# Patient Record
Sex: FEMALE | URBAN - METROPOLITAN AREA
[De-identification: names, ages, dates, MRNs, and addresses within clinical notes are randomized per-mention and may not be internally consistent; named-entity substitution may affect disease eponyms.]

---

## 2023-10-27 ENCOUNTER — APPOINTMENT (RX ONLY)
Dept: URBAN - METROPOLITAN AREA CLINIC 154 | Facility: CLINIC | Age: 43
Setting detail: DERMATOLOGY
End: 2023-10-27

## 2023-10-27 DIAGNOSIS — L73.2 HIDRADENITIS SUPPURATIVA: ICD-10-CM

## 2023-10-27 PROCEDURE — ? PRESCRIPTION

## 2023-10-27 PROCEDURE — ? ORDER TESTS

## 2023-10-27 PROCEDURE — ? TREATMENT REGIMEN

## 2023-10-27 PROCEDURE — 99204 OFFICE O/P NEW MOD 45 MIN: CPT

## 2023-10-27 PROCEDURE — ? COUNSELING

## 2023-10-27 RX ORDER — CLINDAMYCIN PHOSPHATE 10 MG/G
GEL TOPICAL
Qty: 60 | Refills: 3 | Status: ERX | COMMUNITY
Start: 2023-10-27

## 2023-10-27 RX ORDER — SPIRONOLACTONE 100 MG/1
TABLET, FILM COATED ORAL
Qty: 90 | Refills: 0 | Status: ERX | COMMUNITY
Start: 2023-10-27

## 2023-10-27 RX ORDER — DOXYCYCLINE 100 MG/1
CAPSULE ORAL
Qty: 28 | Refills: 0 | Status: ERX | COMMUNITY
Start: 2023-10-27

## 2023-10-27 RX ADMIN — CLINDAMYCIN PHOSPHATE: 10 GEL TOPICAL at 00:00

## 2023-10-27 RX ADMIN — SPIRONOLACTONE: 100 TABLET, FILM COATED ORAL at 00:00

## 2023-10-27 RX ADMIN — DOXYCYCLINE: 100 CAPSULE ORAL at 00:00

## 2023-10-27 ASSESSMENT — LOCATION SIMPLE DESCRIPTION DERM
LOCATION SIMPLE: RIGHT THIGH
LOCATION SIMPLE: RIGHT BUTTOCK

## 2023-10-27 ASSESSMENT — LOCATION DETAILED DESCRIPTION DERM
LOCATION DETAILED: RIGHT ANTERIOR PROXIMAL THIGH
LOCATION DETAILED: RIGHT BUTTOCK

## 2023-10-27 ASSESSMENT — LOCATION ZONE DERM
LOCATION ZONE: LEG
LOCATION ZONE: TRUNK

## 2023-10-27 NOTE — PROCEDURE: ORDER TESTS
Expected Date Of Service: 10/27/2023
Lab Facility: [id number]
Performing Laboratory: [id number]
Bill For Surgical Tray: no
Billing Type: Third-Party Bill

## 2023-10-27 NOTE — PROCEDURE: TREATMENT REGIMEN
Detail Level: Zone
Initiate Treatment: Doxycycline 100 mg BID for 2 weeks. \\nClindamycin 1% gel to use bid during active flares and qd as maintenance. \\nRecommended OTC Panoxyl wash to use daily.\\nSpironolactone 100 mg QAM. Counseled side effects. \\nOrdered labs for BMP to be drawn after 1 week of taking since pt is on lisinopril (10mg)\\nPlan for follow up within 2-3 months

## 2024-01-05 ENCOUNTER — APPOINTMENT (RX ONLY)
Dept: URBAN - METROPOLITAN AREA CLINIC 154 | Facility: CLINIC | Age: 44
Setting detail: DERMATOLOGY
End: 2024-01-05

## 2024-01-05 DIAGNOSIS — L73.2 HIDRADENITIS SUPPURATIVA: ICD-10-CM

## 2024-01-05 PROCEDURE — ? COUNSELING

## 2024-01-05 PROCEDURE — ? TREATMENT REGIMEN

## 2024-01-05 PROCEDURE — ? PRESCRIPTION

## 2024-01-05 PROCEDURE — 99214 OFFICE O/P EST MOD 30 MIN: CPT

## 2024-01-05 RX ORDER — CLINDAMYCIN PHOSPHATE 10 MG/G
GEL TOPICAL
Qty: 60 | Refills: 3 | Status: ERX

## 2024-01-05 RX ORDER — DOXYCYCLINE 100 MG/1
CAPSULE ORAL
Qty: 28 | Refills: 0 | Status: ERX

## 2024-01-05 ASSESSMENT — LOCATION DETAILED DESCRIPTION DERM
LOCATION DETAILED: RIGHT ANTERIOR PROXIMAL THIGH
LOCATION DETAILED: RIGHT BUTTOCK

## 2024-01-05 ASSESSMENT — LOCATION SIMPLE DESCRIPTION DERM
LOCATION SIMPLE: RIGHT BUTTOCK
LOCATION SIMPLE: RIGHT THIGH

## 2024-01-05 ASSESSMENT — LOCATION ZONE DERM
LOCATION ZONE: LEG
LOCATION ZONE: TRUNK

## 2024-01-05 NOTE — PROCEDURE: TREATMENT REGIMEN
Plan: Hold off on spironolactone per patient preference. Risks/benefits discussed again and pt reassured K will be monitored but she is on a low dose of lisinopril - in the event medication is needed in the future
Detail Level: Zone
Initiate Treatment: Gave another round of Doxycycline 100 mg BID x 2 weeks to have on hand in case needed.
Continue Regimen: Clindamycin gel qD with increase to BID while flaring